# Patient Record
(demographics unavailable — no encounter records)

---

## 2025-03-17 NOTE — HISTORY OF PRESENT ILLNESS
[FreeTextEntry1] : 33 yr old for preconception consult due to her elevated thyroid antibodies and Hashimoto's. Patient was seen in the past by endocrinologist, who recommended medication, however patient did not want to take any medication. Patient was also seen by Rheumatology in the past as well, and reports many people in her family have Hashimoto's or autoimmune. Patient had many labs drawn by an NP in January, CRP was elevated, thyroid antibodies >1300. Discussed recommendation to see endocrinologist and let them know she desires to get pregnant. Patient has a visible goiter and reports having thyroid u/s which revealed a few nodules. 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 Patient otherwise healthy and takes many supplements like COQ-10, turmeric, joe, zinc and multivitamins.  Patient does report that she does have trouble losing weight although she watches her diet and exercising. Discussed this is common with thyroid dysfunction.

## 2025-03-17 NOTE — PLAN
[FreeTextEntry1] : 33 yr old with elevated thyroid antibodies and elevated inflammatory markers like CRP  - recommended patient be seen by endocrinologist, as often pregnancy affects thyroid function and is important in the development of the fetus. untreated thyroid problems can lead to cardiac defects, both of her children have significant murmurs.  - recommended she possibly be seen by rheumatology as she reports other auto immune disorders in her family, unsure which ones.  - patient brought labs from another FNP, reviewed with patient.  - recommended she start PNV, be seen by endo and she can try to concieve - patient reports she had a PAP 01/2025 and will email results to the office  - f/u PRN

## 2025-05-15 NOTE — PLAN
[FreeTextEntry1] : 34 y/o female presents to office with amenorrhea  -+GS seen on sono today, discussed possibility of early GA  -f/u GC/CT done today -f/u hcg drawn today  -f/u prenatal blood work drawn today -pt currently taking otc PNV, recommended additional folic acid  -RTO 2 weeks for viability ultrasound and review of prenatal lab results

## 2025-05-15 NOTE — HISTORY OF PRESENT ILLNESS
[Currently Active] : currently active [Men] : men [No] : No [N] : Patient reports normal menses [Y] : Positive pregnancy history [PapSmeardate] : 01/2025 [LMPDate] : 4/5/25 [PGHxTotal] : 3 [[facility]] : 2 [[facility] : 2 [FreeTextEntry1] :  x2

## 2025-05-15 NOTE — PHYSICAL EXAM
[MA] : MA [Appropriately responsive] : appropriately responsive [Alert] : alert [No Acute Distress] : no acute distress [Soft] : soft [Non-tender] : non-tender [Non-distended] : non-distended [No HSM] : No HSM [No Lesions] : no lesions [No Mass] : no mass [Oriented x3] : oriented x3 [Examination Of The Breasts] : a normal appearance [No Masses] : no breast masses were palpable [Labia Majora] : normal [Labia Minora] : normal [Normal] : normal [Uterine Adnexae] : normal [FreeTextEntry2] : Lisette

## 2025-07-28 NOTE — PLAN
[FreeTextEntry1] : Patient is post op DNC. Pt states she is constipated. Recommended to take senna and colace. Recommended to wait for a few cycles and then begin trying again. Recommended to take PNV. Pathology was reviewed and placenta was not molar.

## 2025-07-28 NOTE — HISTORY OF PRESENT ILLNESS
[Pain is well-controlled] : pain is well-controlled [None] : no vaginal bleeding [Normal] : normal [Pathology reviewed] : pathology reviewed [Fever] : no fever [Chills] : no chills [Nausea] : no nausea [Vomiting] : no vomiting [Diarrhea] : no diarrhea [Vaginal Bleeding] : no vaginal bleeding [Pelvic Pressure] : no pelvic pressure [Dysuria] : no dysuria [Vaginal Discharge] : no vaginal discharge [Constipation] : no constipation